# Patient Record
Sex: FEMALE | Race: WHITE
[De-identification: names, ages, dates, MRNs, and addresses within clinical notes are randomized per-mention and may not be internally consistent; named-entity substitution may affect disease eponyms.]

---
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
## 2024-01-26 ENCOUNTER — TELEPHONE (OUTPATIENT)
Dept: NEUROLOGY | Age: 19
End: 2024-01-26

## 2024-01-26 RX ORDER — VALPROIC ACID 250 MG/1
500 CAPSULE, LIQUID FILLED ORAL 2 TIMES DAILY
Qty: 360 CAPSULE | Refills: 0 | Status: SHIPPED | OUTPATIENT
Start: 2024-01-26 | End: 2024-04-25

## 2024-01-26 RX ORDER — ZONISAMIDE 100 MG/1
200 CAPSULE ORAL 2 TIMES DAILY
Qty: 360 CAPSULE | Refills: 0 | Status: SHIPPED | OUTPATIENT
Start: 2024-01-26 | End: 2024-04-25

## 2024-01-26 NOTE — TELEPHONE ENCOUNTER
----- Message from SOREN Patel NP sent at 1/10/2024 12:47 PM EST -----  Please notify patient that their MRI brain results are normal.

## 2024-02-03 ENCOUNTER — HOSPITAL ENCOUNTER (EMERGENCY)
Dept: HOSPITAL 83 - ED | Age: 19
LOS: 1 days | Discharge: HOME | End: 2024-02-04
Payer: COMMERCIAL

## 2024-02-03 VITALS — HEIGHT: 55 IN

## 2024-02-03 DIAGNOSIS — Z79.899: ICD-10-CM

## 2024-02-03 DIAGNOSIS — F43.20: Primary | ICD-10-CM

## 2024-02-03 DIAGNOSIS — G40.909: ICD-10-CM

## 2024-02-03 LAB
ALP SERPL-CCNC: 77 U/L (ref 46–116)
ALT SERPL W P-5'-P-CCNC: 17 U/L (ref 5–49)
APTT PPP: 33.3 SECONDS (ref 20–32.1)
BASOPHILS # BLD AUTO: 0.1 10*3/UL (ref 0–0.1)
BASOPHILS NFR BLD AUTO: 0.4 % (ref 0–1)
BUN SERPL-MCNC: 5 MG/DL (ref 9–23)
CHLORIDE SERPL-SCNC: 110 MMOL/L (ref 98–107)
CK SERPL-CCNC: 80 U/L (ref 34–171)
EOSINOPHIL # BLD AUTO: 0.1 10*3/UL (ref 0–0.4)
EOSINOPHIL # BLD AUTO: 1.1 % (ref 1–4)
EPI CELLS #/AREA URNS HPF: (no result) /[HPF]
ERYTHROCYTE [DISTWIDTH] IN BLOOD BY AUTOMATED COUNT: 13.2 % (ref 0–14.5)
ETHANOL SERPL-MCNC: < 3 MG/DL (ref ?–3)
HCT VFR BLD AUTO: 41.1 % (ref 37–47)
LYMPHOCYTES # BLD AUTO: 2.3 10*3/UL (ref 1.3–4.4)
LYMPHOCYTES NFR BLD AUTO: 20.3 % (ref 27–41)
MCH RBC QN AUTO: 31.8 PG (ref 27–31)
MCHC RBC AUTO-ENTMCNC: 32.4 G/DL (ref 33–37)
MCV RBC AUTO: 98.3 FL (ref 81–99)
MONOCYTES # BLD AUTO: 1.2 10*3/UL (ref 0.1–1)
MONOCYTES NFR BLD MANUAL: 10.2 % (ref 3–9)
NEUT #: 7.6 10*3/UL (ref 2.3–7.9)
NEUT %: 67.7 % (ref 47–73)
NRBC BLD QL AUTO: 0 % (ref 0–0)
PH UR STRIP: 7.5 [PH] (ref 4.5–8)
PLATELET # BLD AUTO: 320 10*3/UL (ref 130–400)
PMV BLD AUTO: 11 FL (ref 9.6–12.3)
POTASSIUM SERPL-SCNC: 3.5 MMOL/L (ref 3.4–5.1)
PROT SERPL-MCNC: 7.8 GM/DL (ref 6–8)
RBC # BLD AUTO: 4.18 10*6/UL (ref 4.1–5.1)
SP GR UR: 1.01 (ref 1–1.03)
UROBILINOGEN UR STRIP-MCNC: 0.2 E.U./DL (ref 0–1)
WBC NRBC COR # BLD AUTO: 11.2 10*3/UL (ref 4.8–10.8)

## 2024-03-14 ENCOUNTER — OFFICE VISIT (OUTPATIENT)
Dept: NEUROLOGY | Age: 19
End: 2024-03-14
Payer: COMMERCIAL

## 2024-03-14 VITALS
TEMPERATURE: 98.4 F | HEIGHT: 64 IN | DIASTOLIC BLOOD PRESSURE: 70 MMHG | BODY MASS INDEX: 31.58 KG/M2 | HEART RATE: 65 BPM | WEIGHT: 185 LBS | OXYGEN SATURATION: 99 % | SYSTOLIC BLOOD PRESSURE: 111 MMHG

## 2024-03-14 DIAGNOSIS — Z87.898 HISTORY OF PSYCHOGENIC NONEPILEPTIC SEIZURE: ICD-10-CM

## 2024-03-14 DIAGNOSIS — E55.9 VITAMIN D DEFICIENCY: ICD-10-CM

## 2024-03-14 DIAGNOSIS — G40.919 BREAKTHROUGH SEIZURE (HCC): ICD-10-CM

## 2024-03-14 DIAGNOSIS — E53.8 FOLATE DEFICIENCY: ICD-10-CM

## 2024-03-14 DIAGNOSIS — G40.B09 NONINTRACTABLE JUVENILE MYOCLONIC EPILEPSY WITHOUT STATUS EPILEPTICUS (HCC): Primary | ICD-10-CM

## 2024-03-14 PROCEDURE — 99214 OFFICE O/P EST MOD 30 MIN: CPT | Performed by: NURSE PRACTITIONER

## 2024-03-14 PROCEDURE — 1036F TOBACCO NON-USER: CPT | Performed by: NURSE PRACTITIONER

## 2024-03-14 PROCEDURE — G8484 FLU IMMUNIZE NO ADMIN: HCPCS | Performed by: NURSE PRACTITIONER

## 2024-03-14 PROCEDURE — G8417 CALC BMI ABV UP PARAM F/U: HCPCS | Performed by: NURSE PRACTITIONER

## 2024-03-14 PROCEDURE — G8427 DOCREV CUR MEDS BY ELIG CLIN: HCPCS | Performed by: NURSE PRACTITIONER

## 2024-03-14 RX ORDER — FOLIC ACID 1 MG/1
1 TABLET ORAL DAILY
Qty: 90 TABLET | Refills: 0 | Status: SHIPPED | OUTPATIENT
Start: 2024-03-14 | End: 2024-06-12

## 2024-03-14 RX ORDER — DIVALPROEX SODIUM 500 MG/1
500 TABLET, DELAYED RELEASE ORAL 2 TIMES DAILY
COMMUNITY
Start: 2024-02-16

## 2024-03-14 NOTE — PROGRESS NOTES
interpreting results and communicating results to the patient/caregiver/family. All questions were answered prior to leaving my office.

## 2024-03-27 ENCOUNTER — HOSPITAL ENCOUNTER (OUTPATIENT)
Dept: HOSPITAL 83 - LAB | Age: 19
Discharge: HOME | End: 2024-03-27
Attending: NURSE PRACTITIONER
Payer: COMMERCIAL

## 2024-03-27 DIAGNOSIS — N91.0: Primary | ICD-10-CM

## 2024-04-03 RX ORDER — FOLIC ACID 1 MG/1
1 TABLET ORAL DAILY
Qty: 90 TABLET | Refills: 0 | Status: SHIPPED | OUTPATIENT
Start: 2024-04-03 | End: 2024-07-02

## 2024-04-11 ENCOUNTER — PATIENT MESSAGE (OUTPATIENT)
Dept: NEUROLOGY | Age: 19
End: 2024-04-11

## 2024-04-11 RX ORDER — FOLIC ACID 1 MG/1
1 TABLET ORAL DAILY
Qty: 90 TABLET | Refills: 0 | Status: SHIPPED | OUTPATIENT
Start: 2024-04-11 | End: 2024-07-10

## 2024-04-11 RX ORDER — ZONISAMIDE 100 MG/1
200 CAPSULE ORAL 2 TIMES DAILY
Qty: 360 CAPSULE | Refills: 0 | Status: SHIPPED | OUTPATIENT
Start: 2024-04-11 | End: 2024-07-10

## 2024-04-11 RX ORDER — DIVALPROEX SODIUM 500 MG/1
500 TABLET, DELAYED RELEASE ORAL 2 TIMES DAILY
Qty: 180 TABLET | Refills: 0 | Status: SHIPPED | OUTPATIENT
Start: 2024-04-11 | End: 2024-07-10

## 2024-04-11 NOTE — TELEPHONE ENCOUNTER
From: Lelo Kelly  To: Maggie Mejia  Sent: 4/11/2024 8:20 AM EDT  Subject: Medications    Lelo kelly really needs her meds filled we're at only 4 days and she's also completely out of Folic acid now.

## 2024-05-21 RX ORDER — DIVALPROEX SODIUM 500 MG/1
500 TABLET, DELAYED RELEASE ORAL 2 TIMES DAILY
Qty: 180 TABLET | Refills: 0 | Status: SHIPPED | OUTPATIENT
Start: 2024-05-21 | End: 2024-08-19

## 2024-05-21 RX ORDER — ZONISAMIDE 100 MG/1
200 CAPSULE ORAL 2 TIMES DAILY
Qty: 360 CAPSULE | Refills: 0 | Status: SHIPPED | OUTPATIENT
Start: 2024-05-21 | End: 2024-08-19

## 2024-07-22 RX ORDER — FOLIC ACID 1 MG/1
1 TABLET ORAL DAILY
Qty: 90 TABLET | Refills: 0 | Status: SHIPPED | OUTPATIENT
Start: 2024-07-22 | End: 2024-10-20

## 2024-08-19 RX ORDER — ZONISAMIDE 100 MG/1
200 CAPSULE ORAL 2 TIMES DAILY
Qty: 360 CAPSULE | Refills: 0 | Status: SHIPPED | OUTPATIENT
Start: 2024-08-19 | End: 2024-11-17

## 2024-08-19 RX ORDER — DIVALPROEX SODIUM 500 MG/1
500 TABLET, DELAYED RELEASE ORAL 2 TIMES DAILY
Qty: 180 TABLET | Refills: 0 | Status: SHIPPED | OUTPATIENT
Start: 2024-08-19 | End: 2024-11-17

## 2024-09-13 ENCOUNTER — OFFICE VISIT (OUTPATIENT)
Dept: NEUROLOGY | Age: 19
End: 2024-09-13
Payer: COMMERCIAL

## 2024-09-13 VITALS
WEIGHT: 185 LBS | TEMPERATURE: 98.2 F | BODY MASS INDEX: 31.58 KG/M2 | HEIGHT: 64 IN | HEART RATE: 63 BPM | SYSTOLIC BLOOD PRESSURE: 120 MMHG | DIASTOLIC BLOOD PRESSURE: 89 MMHG | OXYGEN SATURATION: 97 %

## 2024-09-13 DIAGNOSIS — G40.919 BREAKTHROUGH SEIZURE (HCC): ICD-10-CM

## 2024-09-13 DIAGNOSIS — G40.B09 NONINTRACTABLE JUVENILE MYOCLONIC EPILEPSY WITHOUT STATUS EPILEPTICUS (HCC): Primary | ICD-10-CM

## 2024-09-13 DIAGNOSIS — E55.9 VITAMIN D DEFICIENCY: ICD-10-CM

## 2024-09-13 DIAGNOSIS — E53.8 FOLATE DEFICIENCY: ICD-10-CM

## 2024-09-13 PROCEDURE — 1036F TOBACCO NON-USER: CPT | Performed by: NURSE PRACTITIONER

## 2024-09-13 PROCEDURE — G8417 CALC BMI ABV UP PARAM F/U: HCPCS | Performed by: NURSE PRACTITIONER

## 2024-09-13 PROCEDURE — G8427 DOCREV CUR MEDS BY ELIG CLIN: HCPCS | Performed by: NURSE PRACTITIONER

## 2024-09-13 PROCEDURE — 99214 OFFICE O/P EST MOD 30 MIN: CPT | Performed by: NURSE PRACTITIONER

## 2024-09-13 RX ORDER — DIVALPROEX SODIUM 500 MG/1
500 TABLET, DELAYED RELEASE ORAL 2 TIMES DAILY
Qty: 180 TABLET | Refills: 0 | Status: SHIPPED | OUTPATIENT
Start: 2024-09-13 | End: 2024-12-12

## 2024-09-13 RX ORDER — ZONISAMIDE 100 MG/1
200 CAPSULE ORAL 2 TIMES DAILY
Qty: 360 CAPSULE | Refills: 0 | Status: SHIPPED | OUTPATIENT
Start: 2024-09-13 | End: 2024-12-12

## 2024-10-29 RX ORDER — FOLIC ACID 1 MG/1
1 TABLET ORAL DAILY
Qty: 90 TABLET | Refills: 0 | Status: SHIPPED | OUTPATIENT
Start: 2024-10-29 | End: 2025-01-27

## 2024-11-19 ENCOUNTER — HOSPITAL ENCOUNTER (OUTPATIENT)
Dept: HOSPITAL 83 - LAB | Age: 19
Discharge: HOME | End: 2024-11-19
Attending: FAMILY MEDICINE
Payer: COMMERCIAL

## 2024-11-19 DIAGNOSIS — L04.0: Primary | ICD-10-CM

## 2024-11-19 LAB
ALP SERPL-CCNC: 56 U/L (ref 46–116)
ALT SERPL W P-5'-P-CCNC: 25 U/L (ref 5–49)
BUN SERPL-MCNC: 9 MG/DL (ref 9–23)
CHLORIDE SERPL-SCNC: 106 MMOL/L (ref 98–107)
CHOLEST SERPL-MCNC: 165 MG/DL (ref ?–200)
ERYTHROCYTE [DISTWIDTH] IN BLOOD BY AUTOMATED COUNT: 13.2 % (ref 0–14.5)
HCT VFR BLD AUTO: 44.3 % (ref 37–47)
LDLC SERPL DIRECT ASSAY-MCNC: 106 MG/DL (ref 9–159)
MCH RBC QN AUTO: 31.9 PG (ref 27–31)
MCHC RBC AUTO-ENTMCNC: 32.7 G/DL (ref 33–37)
MCV RBC AUTO: 97.4 FL (ref 81–99)
NRBC BLD QL AUTO: 0 % (ref 0–0)
PLATELET # BLD AUTO: 280 10*3/UL (ref 130–400)
PMV BLD AUTO: 11.3 FL (ref 9.6–12.3)
POTASSIUM SERPL-SCNC: 3.6 MMOL/L (ref 3.4–5.1)
PROT SERPL-MCNC: 8.8 GM/DL (ref 6–8)
RBC # BLD AUTO: 4.55 10*6/UL (ref 4.1–5.1)
TRIGL SERPL-MCNC: 92 MG/DL (ref ?–150)
WBC NRBC COR # BLD AUTO: 9.2 10*3/UL (ref 4.8–10.8)

## 2024-11-19 RX ORDER — ZONISAMIDE 100 MG/1
200 CAPSULE ORAL 2 TIMES DAILY
Qty: 360 CAPSULE | Refills: 0 | Status: SHIPPED | OUTPATIENT
Start: 2024-11-19 | End: 2025-02-17

## 2024-11-26 ENCOUNTER — HOSPITAL ENCOUNTER (OUTPATIENT)
Dept: HOSPITAL 83 - LAB | Age: 19
Discharge: HOME | End: 2024-11-26
Attending: FAMILY MEDICINE
Payer: COMMERCIAL

## 2024-11-26 DIAGNOSIS — E80.6: Primary | ICD-10-CM

## 2024-11-26 RX ORDER — DIVALPROEX SODIUM 500 MG/1
500 TABLET, DELAYED RELEASE ORAL 2 TIMES DAILY
Qty: 180 TABLET | Refills: 0 | Status: SHIPPED | OUTPATIENT
Start: 2024-11-26 | End: 2025-02-24

## 2024-11-27 LAB
ALBUMIN SERPL ELPH-MCNC: 3.6 G/DL (ref 2.9–4.4)
ALBUMIN/GLOB SERPL: 0.8 {RATIO} (ref 0.7–1.7)
ALPHA1 GLOB SERPL ELPH-MCNC: 0.3 G/DL (ref 0–0.4)
ALPHA2 GLOB SERPL ELPH-MCNC: 0.9 G/DL (ref 0.4–1)
B-GLOBULIN SERPL ELPH-MCNC: 1.2 G/DL (ref 0.7–1.3)
GAMMA GLOB SERPL ELPH-MCNC: 2.3 G/DL (ref 0.4–1.8)
GLOBULIN SER CALC-MCNC: 4.7 G/DL (ref 2.2–3.9)
PROT SERPL-MCNC: 8.3 G/DL (ref 6–8.5)

## 2025-01-16 RX ORDER — DIVALPROEX SODIUM 500 MG/1
500 TABLET, DELAYED RELEASE ORAL 2 TIMES DAILY
Qty: 180 TABLET | Refills: 0 | Status: SHIPPED | OUTPATIENT
Start: 2025-01-16 | End: 2025-04-16

## 2025-01-16 NOTE — TELEPHONE ENCOUNTER
Last seen 9/13/2024  Next appt Visit date not found    Requested Prescriptions     Pending Prescriptions Disp Refills    divalproex (DEPAKOTE) 500 MG DR tablet 180 tablet 0     Sig: Take 1 tablet by mouth 2 times daily        Plan:      Continue Zonegran 200 mg twice daily  Continue Depakote 500 mg twice daily              Refilled today  Continue Nayzilam PRN  Continue folic acid  Call with any issues  Return to office in 4 months           Maggie Mejia, SOREN - NP, SOREN, FNP-C  11:55 AM  9/13/2024

## 2025-01-30 RX ORDER — FOLIC ACID 1 MG/1
1 TABLET ORAL DAILY
Qty: 90 TABLET | Refills: 0 | Status: SHIPPED | OUTPATIENT
Start: 2025-01-30 | End: 2025-04-30

## 2025-02-17 RX ORDER — ZONISAMIDE 100 MG/1
200 CAPSULE ORAL 2 TIMES DAILY
Qty: 120 CAPSULE | Refills: 0 | Status: SHIPPED | OUTPATIENT
Start: 2025-02-17 | End: 2025-03-19

## 2025-03-02 ENCOUNTER — HOSPITAL ENCOUNTER (EMERGENCY)
Dept: HOSPITAL 83 - ED | Age: 20
LOS: 1 days | Discharge: HOME | End: 2025-03-03
Payer: COMMERCIAL

## 2025-03-02 VITALS — WEIGHT: 186 LBS | HEIGHT: 55 IN

## 2025-03-02 DIAGNOSIS — N13.2: Primary | ICD-10-CM

## 2025-03-02 DIAGNOSIS — N39.0: ICD-10-CM

## 2025-03-02 DIAGNOSIS — R11.10: ICD-10-CM

## 2025-03-02 DIAGNOSIS — F41.9: ICD-10-CM

## 2025-03-02 DIAGNOSIS — F31.9: ICD-10-CM

## 2025-03-02 DIAGNOSIS — F90.9: ICD-10-CM

## 2025-03-02 LAB
BACTERIA #/AREA URNS HPF: (no result) /[HPF]
EPI CELLS #/AREA URNS HPF: (no result) /[HPF]
LEUKOCYTE ESTERASE UR QL STRIP: (no result)
PH UR STRIP: 7.5 [PH] (ref 4.5–8)
RBC #/AREA URNS HPF: (no result) RBC/HPF (ref 0–2)
SP GR UR: 1.01 (ref 1–1.03)
UROBILINOGEN UR STRIP-MCNC: 0.2 E.U./DL (ref 0–1)
WBC #/AREA URNS HPF: (no result) WBC/HPF (ref 0–5)

## 2025-03-03 LAB
BASOPHILS # BLD AUTO: 0.1 10*3/UL (ref 0–0.1)
BASOPHILS NFR BLD AUTO: 0.4 % (ref 0–1)
BUN SERPL-MCNC: 8 MG/DL (ref 9–23)
CHLORIDE SERPL-SCNC: 102 MMOL/L (ref 98–107)
EOSINOPHIL # BLD AUTO: 0 10*3/UL (ref 0–0.4)
EOSINOPHIL # BLD AUTO: 0.1 % (ref 1–4)
ERYTHROCYTE [DISTWIDTH] IN BLOOD BY AUTOMATED COUNT: 13.5 % (ref 0–14.5)
HCT VFR BLD AUTO: 39.7 % (ref 37–47)
MCH RBC QN AUTO: 32.8 PG (ref 27–31)
MCHC RBC AUTO-ENTMCNC: 33 G/DL (ref 33–37)
MCV RBC AUTO: 99.3 FL (ref 81–99)
MONOCYTES # BLD AUTO: 1.4 10*3/UL (ref 0.1–1)
MONOCYTES NFR BLD MANUAL: 9 % (ref 3–9)
NEUT #: 12.1 10*3/UL (ref 2.3–7.9)
NEUT %: 79.1 % (ref 47–73)
NRBC BLD QL AUTO: 0 % (ref 0–0)
PLATELET # BLD AUTO: 326 10*3/UL (ref 130–400)
PMV BLD AUTO: 10.4 FL (ref 9.6–12.3)
POTASSIUM SERPL-SCNC: 3.9 MMOL/L (ref 3.4–5.1)
RBC # BLD AUTO: 4 10*6/UL (ref 4.1–5.1)
WBC NRBC COR # BLD AUTO: 15.3 10*3/UL (ref 4.8–10.8)

## 2025-03-07 ENCOUNTER — OFFICE VISIT (OUTPATIENT)
Dept: NEUROLOGY | Age: 20
End: 2025-03-07
Payer: COMMERCIAL

## 2025-03-07 VITALS
SYSTOLIC BLOOD PRESSURE: 104 MMHG | DIASTOLIC BLOOD PRESSURE: 70 MMHG | BODY MASS INDEX: 33.63 KG/M2 | HEIGHT: 64 IN | HEART RATE: 87 BPM | WEIGHT: 197 LBS | OXYGEN SATURATION: 97 %

## 2025-03-07 DIAGNOSIS — G40.B09 NONINTRACTABLE JUVENILE MYOCLONIC EPILEPSY WITHOUT STATUS EPILEPTICUS (HCC): Primary | ICD-10-CM

## 2025-03-07 DIAGNOSIS — Z87.898 HISTORY OF PSYCHOGENIC NONEPILEPTIC SEIZURE: ICD-10-CM

## 2025-03-07 PROCEDURE — G8427 DOCREV CUR MEDS BY ELIG CLIN: HCPCS | Performed by: NURSE PRACTITIONER

## 2025-03-07 PROCEDURE — 99214 OFFICE O/P EST MOD 30 MIN: CPT | Performed by: NURSE PRACTITIONER

## 2025-03-07 PROCEDURE — G8417 CALC BMI ABV UP PARAM F/U: HCPCS | Performed by: NURSE PRACTITIONER

## 2025-03-07 PROCEDURE — 1036F TOBACCO NON-USER: CPT | Performed by: NURSE PRACTITIONER

## 2025-03-07 RX ORDER — ARIPIPRAZOLE 10 MG/1
10 TABLET ORAL DAILY
COMMUNITY
Start: 2024-12-14

## 2025-03-07 RX ORDER — NORGESTIMATE AND ETHINYL ESTRADIOL 0.25-0.035
1 KIT ORAL
COMMUNITY

## 2025-03-07 RX ORDER — DIVALPROEX SODIUM 500 MG/1
500 TABLET, DELAYED RELEASE ORAL 2 TIMES DAILY
Qty: 180 TABLET | Refills: 0 | Status: SHIPPED | OUTPATIENT
Start: 2025-03-07 | End: 2025-06-05

## 2025-03-07 RX ORDER — ZONISAMIDE 100 MG/1
200 CAPSULE ORAL 2 TIMES DAILY
Qty: 360 CAPSULE | Refills: 0 | Status: SHIPPED | OUTPATIENT
Start: 2025-03-07 | End: 2025-06-05

## 2025-03-07 RX ORDER — OMEPRAZOLE 20 MG/1
20 CAPSULE, DELAYED RELEASE ORAL DAILY
COMMUNITY

## 2025-03-07 RX ORDER — HYDROXYZINE PAMOATE 25 MG/1
CAPSULE ORAL
COMMUNITY
Start: 2025-02-25

## 2025-03-07 NOTE — PROGRESS NOTES
reports seizure 2 to 3 days ago.  She denies missing medication.  Prior to this spell her last seizure was 3 weeks prior and then December 31   In September, patient reports her last seizure was Sunday however prior to Sunday it was a while since her last 1.  She remains compliant per her report but cannot tell me the dose she is on.   Today patient states she has not had any breakthrough seizures.  Her guardian states that she is much better.  She remains compliant on her medications.  They deny any issues.    Folate deficiency   Continue folic acid supplementation; refilled today    Vitamin D deficiency   Continue supplementation    Borderline personality disorder, impaired mood regulation, reactive attachment disorder and ADHD   Patient previously on on Concerta and Risperdal, today on Abilify, Vistaril and Concerta   Psychiatry is also managing patient's Depakote   Continue with psychiatry per their recommendations    Plan:     Continue Zonegran 200 mg twice daily  Continue Depakote 500 mg twice daily   Refilled today  Continue folic acid  Call with any issues  Return to office in 6 months        Maggie Mejia, SOREN - NP, APRN, FNP-C  9:53 AM  3/7/2025    I spent 34 minutes with this patient obtaining the HPI, performing a medically appropriate exam/evaluation, discussing the exam and plan of care including and not limited to ordering medication/tests/labs, referring to other health care professionals and documentation.   I also spent time preparing to see the patient including chart review, obtaining and/or reviewing separately obtained history, independently interpreting results and communicating results to the patient/caregiver/family. All questions were answered prior to leaving my office.

## 2025-03-18 ENCOUNTER — HOSPITAL ENCOUNTER (OUTPATIENT)
Dept: HOSPITAL 83 - LAB | Age: 20
Discharge: HOME | End: 2025-03-18
Attending: UROLOGY
Payer: COMMERCIAL

## 2025-03-18 DIAGNOSIS — E83.50: ICD-10-CM

## 2025-03-18 DIAGNOSIS — N20.0: Primary | ICD-10-CM

## 2025-03-18 DIAGNOSIS — R31.9: ICD-10-CM

## 2025-03-18 LAB
ALP SERPL-CCNC: 62 U/L (ref 46–116)
ALT SERPL W P-5'-P-CCNC: 36 U/L (ref 5–49)
BACTERIA #/AREA URNS HPF: (no result) /[HPF]
BASOPHILS # BLD AUTO: 0.1 10*3/UL (ref 0–0.1)
BASOPHILS NFR BLD AUTO: 0.6 % (ref 0–1)
BUN SERPL-MCNC: 8 MG/DL (ref 9–23)
CHLORIDE SERPL-SCNC: 103 MMOL/L (ref 98–107)
EOSINOPHIL # BLD AUTO: 0 10*3/UL (ref 0–0.4)
EOSINOPHIL # BLD AUTO: 0.4 % (ref 1–4)
EPI CELLS #/AREA URNS HPF: (no result) /[HPF]
ERYTHROCYTE [DISTWIDTH] IN BLOOD BY AUTOMATED COUNT: 12.4 % (ref 0–14.5)
HCT VFR BLD AUTO: 42 % (ref 37–47)
MCH RBC QN AUTO: 32.6 PG (ref 27–31)
MCHC RBC AUTO-ENTMCNC: 33.3 G/DL (ref 33–37)
MCV RBC AUTO: 97.9 FL (ref 81–99)
MONOCYTES # BLD AUTO: 0.7 10*3/UL (ref 0.1–1)
MONOCYTES NFR BLD MANUAL: 7 % (ref 3–9)
NEUT #: 7.6 10*3/UL (ref 2.3–7.9)
NEUT %: 73.7 % (ref 47–73)
NRBC BLD QL AUTO: 0 % (ref 0–0)
PH UR STRIP: 6.5 [PH] (ref 4.5–8)
PLATELET # BLD AUTO: 340 10*3/UL (ref 130–400)
PMV BLD AUTO: 10.7 FL (ref 9.6–12.3)
POTASSIUM SERPL-SCNC: 3.9 MMOL/L (ref 3.4–5.1)
PROT SERPL-MCNC: 8.3 GM/DL (ref 6–8)
RBC # BLD AUTO: 4.29 10*6/UL (ref 4.1–5.1)
SP GR UR: <= 1.005 (ref 1–1.03)
T3 SERPL-MCNC: 25.6 % (ref 22.4–36.7)
T4 SERPL-MCNC: 10.5 UG/DL (ref 4.5–10.9)
UROBILINOGEN UR STRIP-MCNC: 0.2 E.U./DL (ref 0–1)
WBC #/AREA URNS HPF: (no result) WBC/HPF (ref 0–5)
WBC NRBC COR # BLD AUTO: 10.4 10*3/UL (ref 4.8–10.8)

## 2025-03-20 ENCOUNTER — HOSPITAL ENCOUNTER (OUTPATIENT)
Dept: HOSPITAL 83 - LAB | Age: 20
Discharge: HOME | End: 2025-03-20
Attending: UROLOGY
Payer: COMMERCIAL

## 2025-03-20 DIAGNOSIS — R31.9: ICD-10-CM

## 2025-03-20 DIAGNOSIS — N20.0: ICD-10-CM

## 2025-03-20 DIAGNOSIS — E83.50: Primary | ICD-10-CM

## 2025-03-28 RX ORDER — ZONISAMIDE 100 MG/1
200 CAPSULE ORAL 2 TIMES DAILY
Qty: 360 CAPSULE | Refills: 0 | Status: SHIPPED | OUTPATIENT
Start: 2025-03-28 | End: 2025-06-26

## 2025-04-21 ENCOUNTER — HOSPITAL ENCOUNTER (EMERGENCY)
Dept: HOSPITAL 83 - ED | Age: 20
Discharge: HOME | End: 2025-04-21
Payer: COMMERCIAL

## 2025-04-21 VITALS — HEIGHT: 63.98 IN | WEIGHT: 190 LBS | BODY MASS INDEX: 32.44 KG/M2

## 2025-04-21 DIAGNOSIS — G40.909: ICD-10-CM

## 2025-04-21 DIAGNOSIS — Z79.899: ICD-10-CM
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
## 2025-04-21 DIAGNOSIS — S52.124A: Primary | ICD-10-CM

## 2025-04-21 DIAGNOSIS — F41.9: ICD-10-CM
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
## 2025-04-21 DIAGNOSIS — F90.9: ICD-10-CM

## 2025-04-21 DIAGNOSIS — F32.A: ICD-10-CM
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
## 2025-05-13 RX ORDER — FOLIC ACID 1 MG/1
1 TABLET ORAL DAILY
Qty: 90 TABLET | Refills: 0 | Status: SHIPPED | OUTPATIENT
Start: 2025-05-13 | End: 2025-08-11

## 2025-05-31 NOTE — TELEPHONE ENCOUNTER
Last seen 3/7/2025  Next appt 9/4/2025    Requested Prescriptions     Pending Prescriptions Disp Refills    divalproex (DEPAKOTE) 500 MG DR tablet 180 tablet 0     Sig: Take 1 tablet by mouth 2 times daily       Continue Zonegran 200 mg twice daily  Continue Depakote 500 mg twice daily              Refilled today  Continue folic acid  Call with any issues  Return to office in 6 months

## 2025-06-01 RX ORDER — DIVALPROEX SODIUM 500 MG/1
500 TABLET, DELAYED RELEASE ORAL 2 TIMES DAILY
Qty: 180 TABLET | Refills: 0 | Status: SHIPPED | OUTPATIENT
Start: 2025-06-01 | End: 2025-08-30

## 2025-06-23 NOTE — TELEPHONE ENCOUNTER
Last seen 3/7/2025  Next appt 9/4/2025    Requested Prescriptions     Pending Prescriptions Disp Refills    zonisamide (ZONEGRAN) 100 MG capsule 360 capsule 0     Sig: Take 2 capsules by mouth in the morning and at bedtime      Plan:      Continue Zonegran 200 mg twice daily  Continue Depakote 500 mg twice daily              Refilled today  Continue folic acid  Call with any issues  Return to office in 6 months           Maggie Mejia, SOREN - NP, SOREN, FNP-C  9:53 AM  3/7/2025

## 2025-06-24 RX ORDER — ZONISAMIDE 100 MG/1
200 CAPSULE ORAL 2 TIMES DAILY
Qty: 360 CAPSULE | Refills: 0 | Status: SHIPPED | OUTPATIENT
Start: 2025-06-24 | End: 2025-09-22

## 2025-08-16 RX ORDER — FOLIC ACID 1 MG/1
1 TABLET ORAL DAILY
Qty: 90 TABLET | Refills: 0 | Status: SHIPPED | OUTPATIENT
Start: 2025-08-16 | End: 2025-11-14

## 2025-08-29 RX ORDER — DIVALPROEX SODIUM 500 MG/1
500 TABLET, DELAYED RELEASE ORAL 2 TIMES DAILY
Qty: 180 TABLET | Refills: 0 | Status: SHIPPED | OUTPATIENT
Start: 2025-08-29 | End: 2025-11-27